# Patient Record
Sex: FEMALE | Race: OTHER | NOT HISPANIC OR LATINO | URBAN - METROPOLITAN AREA
[De-identification: names, ages, dates, MRNs, and addresses within clinical notes are randomized per-mention and may not be internally consistent; named-entity substitution may affect disease eponyms.]

---

## 2014-12-22 RX ORDER — IBUPROFEN 200 MG
1 TABLET ORAL
Qty: 0 | Refills: 0 | DISCHARGE
Start: 2014-12-22

## 2017-03-24 ENCOUNTER — OUTPATIENT (OUTPATIENT)
Dept: OUTPATIENT SERVICES | Facility: HOSPITAL | Age: 27
LOS: 1 days | End: 2017-03-24

## 2017-03-24 DIAGNOSIS — Z3A.00 WEEKS OF GESTATION OF PREGNANCY NOT SPECIFIED: ICD-10-CM

## 2017-03-24 DIAGNOSIS — O26.899 OTHER SPECIFIED PREGNANCY RELATED CONDITIONS, UNSPECIFIED TRIMESTER: ICD-10-CM

## 2017-03-27 ENCOUNTER — INPATIENT (INPATIENT)
Facility: HOSPITAL | Age: 27
LOS: 1 days | Discharge: HOME | End: 2017-03-29
Attending: SPECIALIST | Admitting: SPECIALIST

## 2017-03-27 VITALS — WEIGHT: 273.37 LBS | HEIGHT: 67 IN

## 2017-03-27 DIAGNOSIS — O26.899 OTHER SPECIFIED PREGNANCY RELATED CONDITIONS, UNSPECIFIED TRIMESTER: ICD-10-CM

## 2017-03-27 DIAGNOSIS — Z3A.00 WEEKS OF GESTATION OF PREGNANCY NOT SPECIFIED: ICD-10-CM

## 2017-03-27 LAB
BASOPHILS # BLD AUTO: 0.01 K/UL — SIGNIFICANT CHANGE UP (ref 0–0.2)
BASOPHILS NFR BLD AUTO: 0.1 % — SIGNIFICANT CHANGE UP (ref 0–2)
BLD GP AB SCN SERPL QL: NEGATIVE — SIGNIFICANT CHANGE UP
EOSINOPHIL # BLD AUTO: 0.05 K/UL — SIGNIFICANT CHANGE UP (ref 0–0.5)
EOSINOPHIL NFR BLD AUTO: 0.6 % — SIGNIFICANT CHANGE UP (ref 0–6)
HCT VFR BLD CALC: 35.4 % — SIGNIFICANT CHANGE UP (ref 34.5–45)
HGB BLD-MCNC: 11.6 G/DL — SIGNIFICANT CHANGE UP (ref 11.5–15.5)
IMM GRANULOCYTES NFR BLD AUTO: 0.4 % — SIGNIFICANT CHANGE UP (ref 0–1.5)
LYMPHOCYTES # BLD AUTO: 1.8 K/UL — SIGNIFICANT CHANGE UP (ref 1–3.3)
LYMPHOCYTES # BLD AUTO: 21 % — SIGNIFICANT CHANGE UP (ref 13–44)
MCHC RBC-ENTMCNC: 26.5 PG — LOW (ref 27–34)
MCHC RBC-ENTMCNC: 32.8 % — SIGNIFICANT CHANGE UP (ref 32–36)
MCV RBC AUTO: 80.8 FL — SIGNIFICANT CHANGE UP (ref 80–100)
MONOCYTES # BLD AUTO: 0.26 K/UL — SIGNIFICANT CHANGE UP (ref 0–0.9)
MONOCYTES NFR BLD AUTO: 3 % — SIGNIFICANT CHANGE UP (ref 2–14)
NEUTROPHILS # BLD AUTO: 6.41 K/UL — SIGNIFICANT CHANGE UP (ref 1.8–7.4)
NEUTROPHILS NFR BLD AUTO: 74.9 % — SIGNIFICANT CHANGE UP (ref 43–77)
PLATELET # BLD AUTO: 189 K/UL — SIGNIFICANT CHANGE UP (ref 150–400)
PMV BLD: 10.6 FL — SIGNIFICANT CHANGE UP (ref 7–13)
RBC # BLD: 4.38 M/UL — SIGNIFICANT CHANGE UP (ref 3.8–5.2)
RBC # FLD: 15.7 % — HIGH (ref 10.3–14.5)
RH IG SCN BLD-IMP: POSITIVE — SIGNIFICANT CHANGE UP
T PALLIDUM AB TITR SER: NEGATIVE — SIGNIFICANT CHANGE UP
WBC # BLD: 8.56 K/UL — SIGNIFICANT CHANGE UP (ref 3.8–10.5)
WBC # FLD AUTO: 8.56 K/UL — SIGNIFICANT CHANGE UP (ref 3.8–10.5)

## 2017-03-27 RX ORDER — OXYTOCIN 10 UNIT/ML
333.33 VIAL (ML) INJECTION
Qty: 20 | Refills: 0 | Status: DISCONTINUED | OUTPATIENT
Start: 2017-03-27 | End: 2017-03-27

## 2017-03-27 RX ORDER — PRAMOXINE HYDROCHLORIDE 150 MG/15G
1 AEROSOL, FOAM RECTAL EVERY 4 HOURS
Qty: 0 | Refills: 0 | Status: DISCONTINUED | OUTPATIENT
Start: 2017-03-27 | End: 2017-03-27

## 2017-03-27 RX ORDER — KETOROLAC TROMETHAMINE 30 MG/ML
30 SYRINGE (ML) INJECTION ONCE
Qty: 0 | Refills: 0 | Status: DISCONTINUED | OUTPATIENT
Start: 2017-03-27 | End: 2017-03-27

## 2017-03-27 RX ORDER — OXYCODONE HYDROCHLORIDE 5 MG/1
5 TABLET ORAL
Qty: 0 | Refills: 0 | Status: DISCONTINUED | OUTPATIENT
Start: 2017-03-27 | End: 2017-03-29

## 2017-03-27 RX ORDER — HYDROCORTISONE 1 %
1 OINTMENT (GRAM) TOPICAL EVERY 4 HOURS
Qty: 0 | Refills: 0 | Status: DISCONTINUED | OUTPATIENT
Start: 2017-03-27 | End: 2017-03-29

## 2017-03-27 RX ORDER — SODIUM CHLORIDE 9 MG/ML
1000 INJECTION, SOLUTION INTRAVENOUS ONCE
Qty: 0 | Refills: 0 | Status: COMPLETED | OUTPATIENT
Start: 2017-03-27 | End: 2017-03-27

## 2017-03-27 RX ORDER — SODIUM CHLORIDE 9 MG/ML
3 INJECTION INTRAMUSCULAR; INTRAVENOUS; SUBCUTANEOUS EVERY 8 HOURS
Qty: 0 | Refills: 0 | Status: DISCONTINUED | OUTPATIENT
Start: 2017-03-27 | End: 2017-03-28

## 2017-03-27 RX ORDER — HYDROCORTISONE 1 %
1 OINTMENT (GRAM) TOPICAL EVERY 4 HOURS
Qty: 0 | Refills: 0 | Status: DISCONTINUED | OUTPATIENT
Start: 2017-03-27 | End: 2017-03-27

## 2017-03-27 RX ORDER — ACETAMINOPHEN 500 MG
975 TABLET ORAL EVERY 6 HOURS
Qty: 0 | Refills: 0 | Status: DISCONTINUED | OUTPATIENT
Start: 2017-03-27 | End: 2017-03-29

## 2017-03-27 RX ORDER — DOCUSATE SODIUM 100 MG
100 CAPSULE ORAL
Qty: 0 | Refills: 0 | Status: DISCONTINUED | OUTPATIENT
Start: 2017-03-27 | End: 2017-03-29

## 2017-03-27 RX ORDER — PRAMOXINE HYDROCHLORIDE 150 MG/15G
1 AEROSOL, FOAM RECTAL EVERY 4 HOURS
Qty: 0 | Refills: 0 | Status: DISCONTINUED | OUTPATIENT
Start: 2017-03-27 | End: 2017-03-29

## 2017-03-27 RX ORDER — AER TRAVELER 0.5 G/1
1 SOLUTION RECTAL; TOPICAL EVERY 4 HOURS
Qty: 0 | Refills: 0 | Status: DISCONTINUED | OUTPATIENT
Start: 2017-03-27 | End: 2017-03-29

## 2017-03-27 RX ORDER — DIBUCAINE 1 %
1 OINTMENT (GRAM) RECTAL
Qty: 0 | Refills: 0 | DISCHARGE
Start: 2017-03-27

## 2017-03-27 RX ORDER — OXYTOCIN 10 UNIT/ML
41.67 VIAL (ML) INJECTION
Qty: 20 | Refills: 0 | Status: DISCONTINUED | OUTPATIENT
Start: 2017-03-27 | End: 2017-03-27

## 2017-03-27 RX ORDER — PENICILLIN G POTASSIUM 5000000 [IU]/1
POWDER, FOR SOLUTION INTRAMUSCULAR; INTRAPLEURAL; INTRATHECAL; INTRAVENOUS
Qty: 0 | Refills: 0 | Status: DISCONTINUED | OUTPATIENT
Start: 2017-03-27 | End: 2017-03-27

## 2017-03-27 RX ORDER — TETANUS TOXOID, REDUCED DIPHTHERIA TOXOID AND ACELLULAR PERTUSSIS VACCINE, ADSORBED 5; 2.5; 8; 8; 2.5 [IU]/.5ML; [IU]/.5ML; UG/.5ML; UG/.5ML; UG/.5ML
0.5 SUSPENSION INTRAMUSCULAR ONCE
Qty: 0 | Refills: 0 | Status: DISCONTINUED | OUTPATIENT
Start: 2017-03-27 | End: 2017-03-29

## 2017-03-27 RX ORDER — IBUPROFEN 200 MG
600 TABLET ORAL EVERY 6 HOURS
Qty: 0 | Refills: 0 | Status: DISCONTINUED | OUTPATIENT
Start: 2017-03-27 | End: 2017-03-29

## 2017-03-27 RX ORDER — GLYCERIN ADULT
1 SUPPOSITORY, RECTAL RECTAL AT BEDTIME
Qty: 0 | Refills: 0 | Status: DISCONTINUED | OUTPATIENT
Start: 2017-03-27 | End: 2017-03-29

## 2017-03-27 RX ORDER — CITRIC ACID/SODIUM CITRATE 300-500 MG
15 SOLUTION, ORAL ORAL EVERY 4 HOURS
Qty: 0 | Refills: 0 | Status: DISCONTINUED | OUTPATIENT
Start: 2017-03-27 | End: 2017-03-27

## 2017-03-27 RX ORDER — PENICILLIN G POTASSIUM 5000000 [IU]/1
5 POWDER, FOR SOLUTION INTRAMUSCULAR; INTRAPLEURAL; INTRATHECAL; INTRAVENOUS ONCE
Qty: 0 | Refills: 0 | Status: COMPLETED | OUTPATIENT
Start: 2017-03-27 | End: 2017-03-27

## 2017-03-27 RX ORDER — LANOLIN
1 OINTMENT (GRAM) TOPICAL EVERY 6 HOURS
Qty: 0 | Refills: 0 | Status: DISCONTINUED | OUTPATIENT
Start: 2017-03-27 | End: 2017-03-29

## 2017-03-27 RX ORDER — OXYCODONE HYDROCHLORIDE 5 MG/1
5 TABLET ORAL EVERY 4 HOURS
Qty: 0 | Refills: 0 | Status: DISCONTINUED | OUTPATIENT
Start: 2017-03-27 | End: 2017-03-29

## 2017-03-27 RX ORDER — OXYTOCIN 10 UNIT/ML
2 VIAL (ML) INJECTION
Qty: 30 | Refills: 0 | Status: DISCONTINUED | OUTPATIENT
Start: 2017-03-27 | End: 2017-03-27

## 2017-03-27 RX ORDER — DIBUCAINE 1 %
1 OINTMENT (GRAM) RECTAL EVERY 4 HOURS
Qty: 0 | Refills: 0 | Status: DISCONTINUED | OUTPATIENT
Start: 2017-03-27 | End: 2017-03-29

## 2017-03-27 RX ORDER — SODIUM CHLORIDE 9 MG/ML
3 INJECTION INTRAMUSCULAR; INTRAVENOUS; SUBCUTANEOUS EVERY 8 HOURS
Qty: 0 | Refills: 0 | Status: DISCONTINUED | OUTPATIENT
Start: 2017-03-27 | End: 2017-03-27

## 2017-03-27 RX ORDER — MAGNESIUM HYDROXIDE 400 MG/1
30 TABLET, CHEWABLE ORAL
Qty: 0 | Refills: 0 | Status: DISCONTINUED | OUTPATIENT
Start: 2017-03-27 | End: 2017-03-29

## 2017-03-27 RX ORDER — SIMETHICONE 80 MG/1
80 TABLET, CHEWABLE ORAL EVERY 6 HOURS
Qty: 0 | Refills: 0 | Status: DISCONTINUED | OUTPATIENT
Start: 2017-03-27 | End: 2017-03-29

## 2017-03-27 RX ORDER — DIPHENHYDRAMINE HCL 50 MG
25 CAPSULE ORAL EVERY 6 HOURS
Qty: 0 | Refills: 0 | Status: DISCONTINUED | OUTPATIENT
Start: 2017-03-27 | End: 2017-03-29

## 2017-03-27 RX ORDER — AER TRAVELER 0.5 G/1
1 SOLUTION RECTAL; TOPICAL EVERY 4 HOURS
Qty: 0 | Refills: 0 | Status: DISCONTINUED | OUTPATIENT
Start: 2017-03-27 | End: 2017-03-27

## 2017-03-27 RX ORDER — PENICILLIN G POTASSIUM 5000000 [IU]/1
2.5 POWDER, FOR SOLUTION INTRAMUSCULAR; INTRAPLEURAL; INTRATHECAL; INTRAVENOUS EVERY 4 HOURS
Qty: 0 | Refills: 0 | Status: DISCONTINUED | OUTPATIENT
Start: 2017-03-27 | End: 2017-03-27

## 2017-03-27 RX ORDER — SODIUM CHLORIDE 9 MG/ML
1000 INJECTION, SOLUTION INTRAVENOUS
Qty: 0 | Refills: 0 | Status: DISCONTINUED | OUTPATIENT
Start: 2017-03-27 | End: 2017-03-27

## 2017-03-27 RX ORDER — DIBUCAINE 1 %
1 OINTMENT (GRAM) RECTAL EVERY 4 HOURS
Qty: 0 | Refills: 0 | Status: DISCONTINUED | OUTPATIENT
Start: 2017-03-27 | End: 2017-03-27

## 2017-03-27 RX ADMIN — SODIUM CHLORIDE 2000 MILLILITER(S): 9 INJECTION, SOLUTION INTRAVENOUS at 15:35

## 2017-03-27 RX ADMIN — PENICILLIN G POTASSIUM 100 MILLION UNIT(S): 5000000 POWDER, FOR SOLUTION INTRAMUSCULAR; INTRAPLEURAL; INTRATHECAL; INTRAVENOUS at 16:10

## 2017-03-27 RX ADMIN — SODIUM CHLORIDE 125 MILLILITER(S): 9 INJECTION, SOLUTION INTRAVENOUS at 12:15

## 2017-03-27 RX ADMIN — Medication 125 MILLIUNIT(S)/MIN: at 20:13

## 2017-03-27 RX ADMIN — Medication 30 MILLIGRAM(S): at 20:28

## 2017-03-27 RX ADMIN — Medication 1000 MILLIUNIT(S)/MIN: at 20:11

## 2017-03-27 RX ADMIN — PENICILLIN G POTASSIUM 100 MILLION UNIT(S): 5000000 POWDER, FOR SOLUTION INTRAMUSCULAR; INTRAPLEURAL; INTRATHECAL; INTRAVENOUS at 12:01

## 2017-03-27 RX ADMIN — Medication 30 MILLIGRAM(S): at 21:00

## 2017-03-27 RX ADMIN — Medication 2 MILLIUNIT(S)/MIN: at 16:38

## 2017-03-27 NOTE — DISCHARGE NOTE OB - MEDICATION SUMMARY - MEDICATIONS TO TAKE
I will START or STAY ON the medications listed below when I get home from the hospital:    ibuprofen 600 mg oral tablet  -- 1 tab(s) by mouth every 6 hours, As needed,   -- Indication: For pain    dibucaine 1% topical ointment  -- 1 application on skin every 4 hours, As needed, Tenderness of the episiotomy site  -- Indication: For perineum discomfort    Prenatal Multivitamins with Folic Acid 1 mg oral tablet  -- 1 tab(s) by mouth once a day  -- Indication: For vitamins

## 2017-03-27 NOTE — DISCHARGE NOTE OB - CARE PROVIDER_API CALL
King Aiken (MD), Obstetrics and Gynecology  [address] [place] [address]  Phone: [phone]  Fax: [phone]

## 2017-03-27 NOTE — DISCHARGE NOTE OB - PATIENT PORTAL LINK FT
“You can access the FollowHealth Patient Portal, offered by [facility], by registering with the following website: [email or web address]”

## 2017-03-27 NOTE — DISCHARGE NOTE OB - MEDICATION SUMMARY - MEDICATIONS TO STOP TAKING
I will STOP taking the medications listed below when I get home from the hospital:    ibuprofen 600 mg oral tablet  -- 1 tab(s) by mouth every 6 hours, As needed, Moderate Pain    Prenatal Multivitamins with Folic Acid 1 mg oral tablet  -- 1 tab(s) by mouth once a day    dibucaine 1% topical ointment  -- 1 application on skin every 4 hours, As needed, Tenderness of the episiotomy site

## 2017-03-27 NOTE — DISCHARGE NOTE OB - CARE PLAN
Principal Discharge DX:	Normal vaginal delivery  Goal:	discharge 3/29/17  Instructions for follow-up, activity and diet:	follow up in 6 weeks

## 2017-03-28 RX ADMIN — Medication 600 MILLIGRAM(S): at 13:15

## 2017-03-28 RX ADMIN — Medication 975 MILLIGRAM(S): at 05:10

## 2017-03-28 RX ADMIN — Medication 1 SUPPOSITORY(S): at 19:45

## 2017-03-28 RX ADMIN — SODIUM CHLORIDE 3 MILLILITER(S): 9 INJECTION INTRAMUSCULAR; INTRAVENOUS; SUBCUTANEOUS at 15:14

## 2017-03-28 RX ADMIN — Medication 1 TABLET(S): at 12:43

## 2017-03-28 RX ADMIN — SODIUM CHLORIDE 3 MILLILITER(S): 9 INJECTION INTRAMUSCULAR; INTRAVENOUS; SUBCUTANEOUS at 05:10

## 2017-03-28 RX ADMIN — Medication 600 MILLIGRAM(S): at 12:40

## 2017-03-28 RX ADMIN — Medication 600 MILLIGRAM(S): at 05:10

## 2017-03-28 RX ADMIN — Medication 100 MILLIGRAM(S): at 16:38

## 2017-03-28 RX ADMIN — Medication 600 MILLIGRAM(S): at 04:36

## 2017-03-28 RX ADMIN — Medication 100 MILLIGRAM(S): at 12:43

## 2017-03-28 RX ADMIN — Medication 975 MILLIGRAM(S): at 04:36

## 2017-03-29 VITALS
TEMPERATURE: 98 F | DIASTOLIC BLOOD PRESSURE: 58 MMHG | HEART RATE: 64 BPM | OXYGEN SATURATION: 99 % | RESPIRATION RATE: 19 BRPM | SYSTOLIC BLOOD PRESSURE: 100 MMHG

## 2017-03-29 RX ADMIN — OXYCODONE HYDROCHLORIDE 5 MILLIGRAM(S): 5 TABLET ORAL at 00:35

## 2017-03-29 RX ADMIN — Medication 600 MILLIGRAM(S): at 06:07

## 2017-03-29 RX ADMIN — Medication 600 MILLIGRAM(S): at 01:10

## 2017-03-29 RX ADMIN — OXYCODONE HYDROCHLORIDE 5 MILLIGRAM(S): 5 TABLET ORAL at 01:10

## 2017-03-29 RX ADMIN — Medication 600 MILLIGRAM(S): at 07:00

## 2017-03-29 RX ADMIN — OXYCODONE HYDROCHLORIDE 5 MILLIGRAM(S): 5 TABLET ORAL at 07:00

## 2017-03-29 RX ADMIN — OXYCODONE HYDROCHLORIDE 5 MILLIGRAM(S): 5 TABLET ORAL at 06:07

## 2017-03-29 RX ADMIN — Medication 975 MILLIGRAM(S): at 00:35

## 2017-03-29 RX ADMIN — Medication 975 MILLIGRAM(S): at 06:07

## 2017-03-29 RX ADMIN — Medication 975 MILLIGRAM(S): at 01:10

## 2017-03-29 RX ADMIN — Medication 975 MILLIGRAM(S): at 07:00

## 2017-03-29 RX ADMIN — Medication 600 MILLIGRAM(S): at 00:35

## 2017-05-31 PROBLEM — Z00.00 ENCOUNTER FOR PREVENTIVE HEALTH EXAMINATION: Status: ACTIVE | Noted: 2017-05-31

## 2017-06-27 ENCOUNTER — APPOINTMENT (OUTPATIENT)
Dept: OBGYN | Facility: CLINIC | Age: 27
End: 2017-06-27

## 2017-06-27 VITALS
SYSTOLIC BLOOD PRESSURE: 118 MMHG | DIASTOLIC BLOOD PRESSURE: 76 MMHG | BODY MASS INDEX: 43.07 KG/M2 | WEIGHT: 274.4 LBS | HEART RATE: 82 BPM | HEIGHT: 67 IN

## 2017-06-27 DIAGNOSIS — Z01.818 ENCOUNTER FOR OTHER PREPROCEDURAL EXAMINATION: ICD-10-CM

## 2017-06-27 DIAGNOSIS — E66.01 MORBID (SEVERE) OBESITY DUE TO EXCESS CALORIES: ICD-10-CM

## 2017-06-27 DIAGNOSIS — Z83.3 FAMILY HISTORY OF DIABETES MELLITUS: ICD-10-CM

## 2017-06-29 PROBLEM — E66.01 MORBID OBESITY WITH BMI OF 40.0-44.9, ADULT: Status: RESOLVED | Noted: 2017-06-29 | Resolved: 2017-06-29

## 2017-06-29 PROBLEM — Z83.3 FAMILY HISTORY OF TYPE 2 DIABETES MELLITUS: Status: ACTIVE | Noted: 2017-06-29

## 2017-06-30 ENCOUNTER — TRANSCRIPTION ENCOUNTER (OUTPATIENT)
Age: 27
End: 2017-06-30

## 2018-03-03 DIAGNOSIS — Z78.9 OTHER SPECIFIED HEALTH STATUS: ICD-10-CM

## 2018-07-26 PROBLEM — Z78.9 ALCOHOL USE: Status: INACTIVE | Noted: 2017-06-27

## 2019-12-02 ENCOUNTER — RESULT REVIEW (OUTPATIENT)
Age: 29
End: 2019-12-02

## 2021-02-24 ENCOUNTER — RESULT REVIEW (OUTPATIENT)
Age: 31
End: 2021-02-24

## 2022-06-27 ENCOUNTER — RESULT REVIEW (OUTPATIENT)
Age: 32
End: 2022-06-27

## 2023-07-26 ENCOUNTER — INPATIENT (INPATIENT)
Facility: HOSPITAL | Age: 33
LOS: 1 days | Discharge: HOME | End: 2023-07-28
Attending: SURGERY | Admitting: SURGERY
Payer: COMMERCIAL

## 2023-07-26 ENCOUNTER — TRANSCRIPTION ENCOUNTER (OUTPATIENT)
Age: 33
End: 2023-07-26

## 2023-07-26 VITALS
DIASTOLIC BLOOD PRESSURE: 66 MMHG | OXYGEN SATURATION: 100 % | HEART RATE: 78 BPM | TEMPERATURE: 98 F | RESPIRATION RATE: 18 BRPM | SYSTOLIC BLOOD PRESSURE: 125 MMHG

## 2023-07-26 DIAGNOSIS — R10.13 EPIGASTRIC PAIN: ICD-10-CM

## 2023-07-26 LAB
ALBUMIN SERPL ELPH-MCNC: 4.2 G/DL — SIGNIFICANT CHANGE UP (ref 3.3–5)
ALP SERPL-CCNC: 85 U/L — SIGNIFICANT CHANGE UP (ref 40–120)
ALT FLD-CCNC: 22 U/L — SIGNIFICANT CHANGE UP (ref 4–33)
ANION GAP SERPL CALC-SCNC: 13 MMOL/L — SIGNIFICANT CHANGE UP (ref 7–14)
AST SERPL-CCNC: 20 U/L — SIGNIFICANT CHANGE UP (ref 4–32)
BASOPHILS # BLD AUTO: 0.03 K/UL — SIGNIFICANT CHANGE UP (ref 0–0.2)
BASOPHILS NFR BLD AUTO: 0.2 % — SIGNIFICANT CHANGE UP (ref 0–2)
BILIRUB SERPL-MCNC: 1 MG/DL — SIGNIFICANT CHANGE UP (ref 0.2–1.2)
BUN SERPL-MCNC: 7 MG/DL — SIGNIFICANT CHANGE UP (ref 7–23)
CALCIUM SERPL-MCNC: 9.3 MG/DL — SIGNIFICANT CHANGE UP (ref 8.4–10.5)
CHLORIDE SERPL-SCNC: 103 MMOL/L — SIGNIFICANT CHANGE UP (ref 98–107)
CO2 SERPL-SCNC: 22 MMOL/L — SIGNIFICANT CHANGE UP (ref 22–31)
CREAT SERPL-MCNC: 0.75 MG/DL — SIGNIFICANT CHANGE UP (ref 0.5–1.3)
EGFR: 107 ML/MIN/1.73M2 — SIGNIFICANT CHANGE UP
EOSINOPHIL # BLD AUTO: 0.07 K/UL — SIGNIFICANT CHANGE UP (ref 0–0.5)
EOSINOPHIL NFR BLD AUTO: 0.6 % — SIGNIFICANT CHANGE UP (ref 0–6)
GLUCOSE SERPL-MCNC: 95 MG/DL — SIGNIFICANT CHANGE UP (ref 70–99)
HCT VFR BLD CALC: 38.2 % — SIGNIFICANT CHANGE UP (ref 34.5–45)
HGB BLD-MCNC: 12.2 G/DL — SIGNIFICANT CHANGE UP (ref 11.5–15.5)
IANC: 9.42 K/UL — HIGH (ref 1.8–7.4)
IMM GRANULOCYTES NFR BLD AUTO: 0.2 % — SIGNIFICANT CHANGE UP (ref 0–0.9)
LIDOCAIN IGE QN: 24 U/L — SIGNIFICANT CHANGE UP (ref 7–60)
LYMPHOCYTES # BLD AUTO: 19.6 % — SIGNIFICANT CHANGE UP (ref 13–44)
LYMPHOCYTES # BLD AUTO: 2.49 K/UL — SIGNIFICANT CHANGE UP (ref 1–3.3)
MCHC RBC-ENTMCNC: 26 PG — LOW (ref 27–34)
MCHC RBC-ENTMCNC: 31.9 GM/DL — LOW (ref 32–36)
MCV RBC AUTO: 81.3 FL — SIGNIFICANT CHANGE UP (ref 80–100)
MONOCYTES # BLD AUTO: 0.68 K/UL — SIGNIFICANT CHANGE UP (ref 0–0.9)
MONOCYTES NFR BLD AUTO: 5.3 % — SIGNIFICANT CHANGE UP (ref 2–14)
NEUTROPHILS # BLD AUTO: 9.42 K/UL — HIGH (ref 1.8–7.4)
NEUTROPHILS NFR BLD AUTO: 74.1 % — SIGNIFICANT CHANGE UP (ref 43–77)
NRBC # BLD: 0 /100 WBCS — SIGNIFICANT CHANGE UP (ref 0–0)
NRBC # FLD: 0 K/UL — SIGNIFICANT CHANGE UP (ref 0–0)
PLATELET # BLD AUTO: 269 K/UL — SIGNIFICANT CHANGE UP (ref 150–400)
POTASSIUM SERPL-MCNC: 4 MMOL/L — SIGNIFICANT CHANGE UP (ref 3.5–5.3)
POTASSIUM SERPL-SCNC: 4 MMOL/L — SIGNIFICANT CHANGE UP (ref 3.5–5.3)
PROT SERPL-MCNC: 7.7 G/DL — SIGNIFICANT CHANGE UP (ref 6–8.3)
RBC # BLD: 4.7 M/UL — SIGNIFICANT CHANGE UP (ref 3.8–5.2)
RBC # FLD: 14.3 % — SIGNIFICANT CHANGE UP (ref 10.3–14.5)
SODIUM SERPL-SCNC: 138 MMOL/L — SIGNIFICANT CHANGE UP (ref 135–145)
WBC # BLD: 12.72 K/UL — HIGH (ref 3.8–10.5)
WBC # FLD AUTO: 12.72 K/UL — HIGH (ref 3.8–10.5)

## 2023-07-26 PROCEDURE — 99285 EMERGENCY DEPT VISIT HI MDM: CPT

## 2023-07-26 PROCEDURE — 76705 ECHO EXAM OF ABDOMEN: CPT | Mod: 26

## 2023-07-26 PROCEDURE — 99221 1ST HOSP IP/OBS SF/LOW 40: CPT | Mod: 57,GC

## 2023-07-26 RX ORDER — HYDROMORPHONE HYDROCHLORIDE 2 MG/ML
1 INJECTION INTRAMUSCULAR; INTRAVENOUS; SUBCUTANEOUS EVERY 4 HOURS
Refills: 0 | Status: DISCONTINUED | OUTPATIENT
Start: 2023-07-26 | End: 2023-07-28

## 2023-07-26 RX ORDER — ACETAMINOPHEN 500 MG
1000 TABLET ORAL ONCE
Refills: 0 | Status: COMPLETED | OUTPATIENT
Start: 2023-07-26 | End: 2023-07-26

## 2023-07-26 RX ORDER — MORPHINE SULFATE 50 MG/1
4 CAPSULE, EXTENDED RELEASE ORAL ONCE
Refills: 0 | Status: DISCONTINUED | OUTPATIENT
Start: 2023-07-26 | End: 2023-07-26

## 2023-07-26 RX ORDER — HYDROMORPHONE HYDROCHLORIDE 2 MG/ML
0.5 INJECTION INTRAMUSCULAR; INTRAVENOUS; SUBCUTANEOUS EVERY 4 HOURS
Refills: 0 | Status: DISCONTINUED | OUTPATIENT
Start: 2023-07-26 | End: 2023-07-28

## 2023-07-26 RX ORDER — ENOXAPARIN SODIUM 100 MG/ML
40 INJECTION SUBCUTANEOUS EVERY 24 HOURS
Refills: 0 | Status: DISCONTINUED | OUTPATIENT
Start: 2023-07-26 | End: 2023-07-28

## 2023-07-26 RX ORDER — ACETAMINOPHEN 500 MG
1000 TABLET ORAL EVERY 6 HOURS
Refills: 0 | Status: COMPLETED | OUTPATIENT
Start: 2023-07-27 | End: 2023-07-28

## 2023-07-26 RX ORDER — SODIUM CHLORIDE 9 MG/ML
1000 INJECTION INTRAMUSCULAR; INTRAVENOUS; SUBCUTANEOUS ONCE
Refills: 0 | Status: COMPLETED | OUTPATIENT
Start: 2023-07-26 | End: 2023-07-26

## 2023-07-26 RX ORDER — SODIUM CHLORIDE 9 MG/ML
1000 INJECTION, SOLUTION INTRAVENOUS
Refills: 0 | Status: DISCONTINUED | OUTPATIENT
Start: 2023-07-26 | End: 2023-07-27

## 2023-07-26 RX ADMIN — MORPHINE SULFATE 4 MILLIGRAM(S): 50 CAPSULE, EXTENDED RELEASE ORAL at 23:09

## 2023-07-26 RX ADMIN — MORPHINE SULFATE 4 MILLIGRAM(S): 50 CAPSULE, EXTENDED RELEASE ORAL at 23:51

## 2023-07-26 RX ADMIN — MORPHINE SULFATE 4 MILLIGRAM(S): 50 CAPSULE, EXTENDED RELEASE ORAL at 19:13

## 2023-07-26 RX ADMIN — SODIUM CHLORIDE 1000 MILLILITER(S): 9 INJECTION INTRAMUSCULAR; INTRAVENOUS; SUBCUTANEOUS at 19:14

## 2023-07-26 RX ADMIN — Medication 400 MILLIGRAM(S): at 23:37

## 2023-07-26 NOTE — ED ADULT NURSE NOTE - NSFALLUNIVINTERV_ED_ALL_ED
Bed/Stretcher in lowest position, wheels locked, appropriate side rails in place/Call bell, personal items and telephone in reach/Instruct patient to call for assistance before getting out of bed/chair/stretcher/Non-slip footwear applied when patient is off stretcher/[place] to call system/Physically safe environment - no spills, clutter or unnecessary equipment/Purposeful proactive rounding/Room/bathroom lighting operational, light cord in reach

## 2023-07-26 NOTE — ED PROVIDER NOTE - CLINICAL SUMMARY MEDICAL DECISION MAKING FREE TEXT BOX
Impression  Given testing done as an outpatient shows cholelithiasis likely cholelithiasis will get labs including LFTs, repeat ultrasound  Given intermittent nature and present for over a month we will consult surgery  We will treat pain with IV fluid and morphine

## 2023-07-26 NOTE — ED ADULT NURSE NOTE - OBJECTIVE STATEMENT
A&Ox4. ambulatory. c/o diffuse ABD pain since July. NAD. pt denies SOB, chest pain, dizziness, weakness, urinary symptoms, HA, n/v/d, fevers, chills. respirations are even and un labored. ABD is soft. skin intact. 20g placed to LAC. labs drawn and sent. safety precautions maintained.

## 2023-07-26 NOTE — ED PROVIDER NOTE - PHYSICAL EXAMINATION
Physical exam  Patient mildly uncomfortable, nontoxic-appearing  Vital signs stable  Lungs clear to auscultation bilaterally  S1-S2 no murmurs rubs or gallops  Abdomen severe pain to the epigastrium and right upper quadrant no rebound no guarding  Extremities no edema n/a

## 2023-07-26 NOTE — ED ADULT NURSE REASSESSMENT NOTE - SYMPTOMS
states abd pain has improved alittle but states back pain is the same. IV tylenol given as ordered/abdominal pain

## 2023-07-26 NOTE — ED ADULT TRIAGE NOTE - CHIEF COMPLAINT QUOTE
Pt st" My MD sent me told I have gallstones and my White count is very elevated. I been having pain since July."

## 2023-07-26 NOTE — ED ADULT NURSE REASSESSMENT NOTE - REASSESS COMMUNICATION
8S called report to ILYA Gallagher/inpatient nurse report called
medicated as ordered/ED physician notified

## 2023-07-26 NOTE — H&P ADULT - NSHPLABSRESULTS_GEN_ALL_CORE
12.2   12.72 )-----------( 269      ( 26 Jul 2023 19:15 )             38.2       07-26    138  |  103  |  7   ----------------------------<  95  4.0   |  22  |  0.75    Ca    9.3      26 Jul 2023 19:15    TPro  7.7  /  Alb  4.2  /  TBili  1.0  /  DBili  x   /  AST  20  /  ALT  22  /  AlkPhos  85  07-26              Urinalysis Basic - ( 26 Jul 2023 19:15 )    Color: x / Appearance: x / SG: x / pH: x  Gluc: 95 mg/dL / Ketone: x  / Bili: x / Urobili: x   Blood: x / Protein: x / Nitrite: x   Leuk Esterase: x / RBC: x / WBC x   Sq Epi: x / Non Sq Epi: x / Bacteria: x      < from: US Abdomen Upper Quadrant Right (07.26.23 @ 21:00) >    FINDINGS:  Liver: Within normal limits.  Bile ducts: Normal caliber. Common bile duct measures 6 mm.  Gallbladder: There is a small amount of gallbladder sludge. The   gallbladder is distended. There is mild gallbladder wall thickening.   There is positive sonographic Anne's sign.  Pancreas: Visualized portions are within normal limits.  Right kidney: 12.3 cm. No hydronephrosis.  Ascites: None.  IVC: Visualized portions are within normal limits.    IMPRESSION:  Distended gallbladder with mild gallbladder wall thickening, gallbladder   sludge, and positive Anne's sign. This is suggestive of possible   acalculous cholecystitis. If clinically warranted, a HIDA scan may be   performed for confirmation.    < end of copied text >

## 2023-07-26 NOTE — H&P ADULT - ATTENDING COMMENTS
acute calculous cholecystitis  admit, bowel rest, iv hydration, iv antibiotics  to OR pending availability  hand-off given to B team surgeon-of-week

## 2023-07-26 NOTE — H&P ADULT - HISTORY OF PRESENT ILLNESS
32 obese F no PSH presenting with intermittent RUQ pain radiating to her back for several months, however the pain became persistent and more sever over the last day, associated with nausea and two episodes of NBNB emesis. She denies fevers, chills, diarrhea, constipation.

## 2023-07-26 NOTE — H&P ADULT - ASSESSMENT
32F with acute cholecystitis.    Admit to Dr. Nomi Arndt  NPO, IVF  IV Abx: cefotetan  Pain control PRN  VTE ppx: SCDs, lovenox  OR for laparoscopic cholecystectomy      B Team Surgery [id number]

## 2023-07-26 NOTE — ED PROVIDER NOTE - OBJECTIVE STATEMENT
32-year-old female with no significant past medical history, no surgical history presents after outpatient labs and ultrasound show cholelithiasis with leukocytosis.  Patient went to her doctor complaint of epigastric pain and vomiting since yesterday.   has been having this pain intermittently for the past 3 to 4 weeks.  Patient  over the last year has lost approximately 20 to 30 pounds.   has modified her diet.  Denies fevers.  Denies blood in emesis.  Sent to emergency room for evaluation.

## 2023-07-27 ENCOUNTER — TRANSCRIPTION ENCOUNTER (OUTPATIENT)
Age: 33
End: 2023-07-27

## 2023-07-27 ENCOUNTER — RESULT REVIEW (OUTPATIENT)
Age: 33
End: 2023-07-27

## 2023-07-27 LAB
ALBUMIN SERPL ELPH-MCNC: 3.4 G/DL — SIGNIFICANT CHANGE UP (ref 3.3–5)
ALP SERPL-CCNC: 72 U/L — SIGNIFICANT CHANGE UP (ref 40–120)
ALT FLD-CCNC: 20 U/L — SIGNIFICANT CHANGE UP (ref 4–33)
ANION GAP SERPL CALC-SCNC: 16 MMOL/L — HIGH (ref 7–14)
APPEARANCE UR: CLEAR — SIGNIFICANT CHANGE UP
APTT BLD: 25.7 SEC — SIGNIFICANT CHANGE UP (ref 24.5–35.6)
AST SERPL-CCNC: 15 U/L — SIGNIFICANT CHANGE UP (ref 4–32)
BACTERIA # UR AUTO: NEGATIVE /HPF — SIGNIFICANT CHANGE UP
BILIRUB SERPL-MCNC: 1.2 MG/DL — SIGNIFICANT CHANGE UP (ref 0.2–1.2)
BILIRUB UR-MCNC: NEGATIVE — SIGNIFICANT CHANGE UP
BUN SERPL-MCNC: 6 MG/DL — LOW (ref 7–23)
CALCIUM SERPL-MCNC: 8.5 MG/DL — SIGNIFICANT CHANGE UP (ref 8.4–10.5)
CAST: 0 /LPF — SIGNIFICANT CHANGE UP (ref 0–4)
CHLORIDE SERPL-SCNC: 105 MMOL/L — SIGNIFICANT CHANGE UP (ref 98–107)
CO2 SERPL-SCNC: 19 MMOL/L — LOW (ref 22–31)
COLOR SPEC: YELLOW — SIGNIFICANT CHANGE UP
CREAT SERPL-MCNC: 0.77 MG/DL — SIGNIFICANT CHANGE UP (ref 0.5–1.3)
DIFF PNL FLD: NEGATIVE — SIGNIFICANT CHANGE UP
EGFR: 104 ML/MIN/1.73M2 — SIGNIFICANT CHANGE UP
GLUCOSE SERPL-MCNC: 84 MG/DL — SIGNIFICANT CHANGE UP (ref 70–99)
GLUCOSE UR QL: NEGATIVE MG/DL — SIGNIFICANT CHANGE UP
HCG SERPL-ACNC: <1 MIU/ML — SIGNIFICANT CHANGE UP
HCT VFR BLD CALC: 32.1 % — LOW (ref 34.5–45)
HGB BLD-MCNC: 10.3 G/DL — LOW (ref 11.5–15.5)
INR BLD: 1.03 RATIO — SIGNIFICANT CHANGE UP (ref 0.85–1.18)
KETONES UR-MCNC: NEGATIVE MG/DL — SIGNIFICANT CHANGE UP
LEUKOCYTE ESTERASE UR-ACNC: ABNORMAL
MCHC RBC-ENTMCNC: 25.3 PG — LOW (ref 27–34)
MCHC RBC-ENTMCNC: 32.1 GM/DL — SIGNIFICANT CHANGE UP (ref 32–36)
MCV RBC AUTO: 78.9 FL — LOW (ref 80–100)
NITRITE UR-MCNC: NEGATIVE — SIGNIFICANT CHANGE UP
NRBC # BLD: 0 /100 WBCS — SIGNIFICANT CHANGE UP (ref 0–0)
NRBC # FLD: 0 K/UL — SIGNIFICANT CHANGE UP (ref 0–0)
PH UR: 6 — SIGNIFICANT CHANGE UP (ref 5–8)
PHOSPHATE SERPL-MCNC: 3.2 MG/DL — SIGNIFICANT CHANGE UP (ref 2.5–4.5)
PLATELET # BLD AUTO: 206 K/UL — SIGNIFICANT CHANGE UP (ref 150–400)
POTASSIUM SERPL-MCNC: 3.4 MMOL/L — LOW (ref 3.5–5.3)
POTASSIUM SERPL-SCNC: 3.4 MMOL/L — LOW (ref 3.5–5.3)
PROT SERPL-MCNC: 6.4 G/DL — SIGNIFICANT CHANGE UP (ref 6–8.3)
PROT UR-MCNC: NEGATIVE MG/DL — SIGNIFICANT CHANGE UP
PROTHROM AB SERPL-ACNC: 11.5 SEC — SIGNIFICANT CHANGE UP (ref 9.5–13)
RBC # BLD: 4.07 M/UL — SIGNIFICANT CHANGE UP (ref 3.8–5.2)
RBC # FLD: 14.6 % — HIGH (ref 10.3–14.5)
RBC CASTS # UR COMP ASSIST: 1 /HPF — SIGNIFICANT CHANGE UP (ref 0–4)
SODIUM SERPL-SCNC: 140 MMOL/L — SIGNIFICANT CHANGE UP (ref 135–145)
SP GR SPEC: 1.02 — SIGNIFICANT CHANGE UP (ref 1–1.03)
SQUAMOUS # UR AUTO: 2 /HPF — SIGNIFICANT CHANGE UP (ref 0–5)
UROBILINOGEN FLD QL: 0.2 MG/DL — SIGNIFICANT CHANGE UP (ref 0.2–1)
WBC # BLD: 6.98 K/UL — SIGNIFICANT CHANGE UP (ref 3.8–10.5)
WBC # FLD AUTO: 6.98 K/UL — SIGNIFICANT CHANGE UP (ref 3.8–10.5)
WBC UR QL: 3 /HPF — SIGNIFICANT CHANGE UP (ref 0–5)

## 2023-07-27 PROCEDURE — 88304 TISSUE EXAM BY PATHOLOGIST: CPT | Mod: 26

## 2023-07-27 PROCEDURE — 47562 LAPAROSCOPIC CHOLECYSTECTOMY: CPT | Mod: GC

## 2023-07-27 DEVICE — ARISTA 3GR: Type: IMPLANTABLE DEVICE | Status: FUNCTIONAL

## 2023-07-27 DEVICE — CLIP APPLIER COVIDIEN ENDOCLIP III 5MM: Type: IMPLANTABLE DEVICE | Status: FUNCTIONAL

## 2023-07-27 RX ORDER — PIPERACILLIN AND TAZOBACTAM 4; .5 G/20ML; G/20ML
3.38 INJECTION, POWDER, LYOPHILIZED, FOR SOLUTION INTRAVENOUS EVERY 8 HOURS
Refills: 0 | Status: DISCONTINUED | OUTPATIENT
Start: 2023-07-27 | End: 2023-07-27

## 2023-07-27 RX ORDER — OXYCODONE HYDROCHLORIDE 5 MG/1
1 TABLET ORAL
Qty: 8 | Refills: 0
Start: 2023-07-27

## 2023-07-27 RX ORDER — PIPERACILLIN AND TAZOBACTAM 4; .5 G/20ML; G/20ML
3.38 INJECTION, POWDER, LYOPHILIZED, FOR SOLUTION INTRAVENOUS ONCE
Refills: 0 | Status: COMPLETED | OUTPATIENT
Start: 2023-07-27 | End: 2023-07-27

## 2023-07-27 RX ORDER — PIPERACILLIN AND TAZOBACTAM 4; .5 G/20ML; G/20ML
3.38 INJECTION, POWDER, LYOPHILIZED, FOR SOLUTION INTRAVENOUS ONCE
Refills: 0 | Status: DISCONTINUED | OUTPATIENT
Start: 2023-07-27 | End: 2023-07-27

## 2023-07-27 RX ADMIN — HYDROMORPHONE HYDROCHLORIDE 0.5 MILLIGRAM(S): 2 INJECTION INTRAMUSCULAR; INTRAVENOUS; SUBCUTANEOUS at 23:53

## 2023-07-27 RX ADMIN — PIPERACILLIN AND TAZOBACTAM 200 GRAM(S): 4; .5 INJECTION, POWDER, LYOPHILIZED, FOR SOLUTION INTRAVENOUS at 06:32

## 2023-07-27 RX ADMIN — PIPERACILLIN AND TAZOBACTAM 25 GRAM(S): 4; .5 INJECTION, POWDER, LYOPHILIZED, FOR SOLUTION INTRAVENOUS at 10:28

## 2023-07-27 RX ADMIN — Medication 400 MILLIGRAM(S): at 05:41

## 2023-07-27 RX ADMIN — SODIUM CHLORIDE 100 MILLILITER(S): 9 INJECTION, SOLUTION INTRAVENOUS at 10:28

## 2023-07-27 RX ADMIN — ENOXAPARIN SODIUM 40 MILLIGRAM(S): 100 INJECTION SUBCUTANEOUS at 23:52

## 2023-07-27 RX ADMIN — Medication 1000 MILLIGRAM(S): at 22:00

## 2023-07-27 RX ADMIN — Medication 400 MILLIGRAM(S): at 20:33

## 2023-07-27 RX ADMIN — Medication 1000 MILLIGRAM(S): at 06:11

## 2023-07-27 NOTE — PATIENT PROFILE ADULT - NSPROMEDSADMININFO_GEN_A_NUR
----- Message from Ericka Saunders sent at 2/25/2019  8:56 AM CST -----  Contact: Patient  The pt is calling to do her transmission. Please call her back @ [phone]. Thanks, Ericka    no concerns

## 2023-07-27 NOTE — BRIEF OPERATIVE NOTE - OPERATION/FINDINGS
Acute inflamed gallbladder, aspirated with needle, critical view achieved. Cystic duct and artery clipped with green hemolocks. Gallbladder taken in toto without spillage.

## 2023-07-27 NOTE — ASU PREOP CHECKLIST - PATIENT'S PERSONAL PROPERTY REMOVED
sent back to room/[facility] sent back to room- Kayla zuniga/trina sent back to room- Kayla CARABALLO aware/glasses/jewelry

## 2023-07-27 NOTE — DISCHARGE NOTE PROVIDER - HOSPITAL COURSE
32 year old obese female with no PMHx/PSHx presented to [facility] 7/26 with intermittent RUQ pain radiating to her back for several months, worsening in the last day and associated with nausea and two episode of emesis. Imaging showing the following:  "IMPRESSION: Distended gallbladder with mild gallbladder wall thickening, gallbladder sludge, and positive Anne's sign. This is suggestive of possible acalculous cholecystitis. If clinically warranted, a HIDA scan may be performed for confirmation."  Patient diagnosed with acute cholecystitis and added on to the OR schedule for a laparoscopic cholecystectomy.  On 7/27, patient underwent  ***********       32 year old obese female with no PMHx/PSHx presented to [facility] 7/26 with intermittent RUQ pain radiating to her back for several months, worsening in the last day and associated with nausea and two episode of emesis. Imaging showing the following: "IMPRESSION: Distended gallbladder with mild gallbladder wall thickening, gallbladder sludge, and positive Anne's sign. This is suggestive of possible acalculous cholecystitis. If clinically warranted, a HIDA scan may be performed for confirmation." Patient diagnosed with acute cholecystitis and added on to the OR schedule for a laparoscopic cholecystectomy.  Patient underwent laparoscopic cholecystectomy on 07/27/23 and tolerated procedure well. At the time of discharge, the patient was hemodynamically stable, was tolerating PO diet, was voiding urine, was ambulating, and was comfortable with adequate pain control. The patient was instructed to follow up with Dr. Feldman within 1-2 weeks after discharge from the hospital. The patient felt comfortable with discharge. The patient was discharged to home. The patient had no other issues.

## 2023-07-27 NOTE — DISCHARGE NOTE PROVIDER - NSDCMRMEDTOKEN_GEN_ALL_CORE_FT
oxyCODONE 5 mg oral tablet: 1 tab(s) orally every 4 to 6 hours as needed for  severe pain MDD: 6   acetaminophen 325 mg oral tablet: 3 tab(s) orally every 6 hours  ibuprofen 400 mg oral tablet: 1 tab(s) orally every 6 hours  oxyCODONE 5 mg oral tablet: 1 tab(s) orally every 4 to 6 hours as needed for  severe pain MDD: 6

## 2023-07-27 NOTE — DISCHARGE NOTE PROVIDER - NSDCFUADDINST_GEN_ALL_CORE_FT
Post-operative Instructions    	Wound dressing- You have a transparent/purple liquid dressing (called Dermabond) over your surgical incisions called. Do NOT remove the Dermabond. In a few days, the Dermabond will fall off (or peel off) on its own.    	Showering/Bathing- You may shower over the dressing the very next day after surgery.  Allow the water to run over the dressing, but do not scrub the area.  Do not sit in a bathtub or swimming pool for at least two weeks after surgery.    	Activity level- You may resume most normal daily activity as tolerated, but avoid strenuous activities such as aerobics, jogging, exercising or heavy lifting for at least 2 weeks after surgery.  You may return to work in 1-2 days after surgery.  You may drive as long as you are not taking any prescription pain medication.    	Pain Medication- Please take tylenol every 6 hours, and stagger with ibuprofen every 6 hours that you take in a pain medication every 3 hours. This will allow you to alternate medications for more consistent pain control. For example: take a dose of tylenol at 8 am, then take a dose of ibuprofen at 11 am, then take a dose of tylenol at 2pm, and continue as needed. You may take the prescribed pain medication for any break through pain as needed.    	Follow-up Appointment- Please call the office to schedule your post-operative appointment which should be approximately 10-14 days after your surgery.     	Bruising/Bleeding/Swelling- It is normal for there to be some bruising and swelling at the site, and there may be some staining of blood on to the dressing.  Some discomfort at the surgical site is expected.  If your symptoms seem excessive, or if you have any question or concerns, please call the office.      Anesthesia Precautions:  For the next 12 hours do not:   •	drive a car,  •	drink alcohol, beer, or wine  •	make important personal or business decisions    Diet:   •	Progress diet slowly unless otherwise indicated    Physician Notification  •	Any Prescription issues  •	Bleeding that does not stop  •	Persistent nausea and vomiting  •	Pain not relieved by medications  •	Fever greater than 101®F  •	Inability to tolerate liquids or foods  •	Unable to urinate after 8 hours    Follow Up Care:  •	In the event that you develop a complication and you are unable to reach your own physician, you may contact:  [phone] or go to the nearest     Emergency Room  •	Please call your surgeon to schedule your follow up appointment

## 2023-07-27 NOTE — PROGRESS NOTE ADULT - SUBJECTIVE AND OBJECTIVE BOX
Surgery Progress Note    S: Patient seen and examined. No acute events overnight.    O:  Physical Exam:  Gen: Laying in bed, NAD  HEENT: atrumatic, EMOI  Resp: Unlabored breathing  Abd: soft, nondistended, no rebound or guarding. Tender in RUQ.  Ext: Moves 4 extremities spontaneously    Vital Signs Last 24 Hrs  T(C): 37.1 (27 Jul 2023 10:00), Max: 37.1 (27 Jul 2023 10:00)  T(F): 98.8 (27 Jul 2023 10:00), Max: 98.8 (27 Jul 2023 10:00)  HR: 67 (27 Jul 2023 10:00) (65 - 82)  BP: 103/59 (27 Jul 2023 10:00) (103/59 - 125/66)  BP(mean): --  RR: 18 (27 Jul 2023 10:00) (18 - 18)  SpO2: 98% (27 Jul 2023 10:00) (97% - 100%)    Parameters below as of 27 Jul 2023 05:41  Patient On (Oxygen Delivery Method): room air        I&O's Detail    26 Jul 2023 07:01  -  27 Jul 2023 07:00  --------------------------------------------------------  IN:    Lactated Ringers: 400 mL  Total IN: 400 mL    OUT:    Oral Fluid: 0 mL    Voided (mL): 800 mL  Total OUT: 800 mL    Total NET: -400 mL                                10.3   6.98  )-----------( 206      ( 27 Jul 2023 07:28 )             32.1       07-27    140  |  105  |  6<L>  ----------------------------<  84  3.4<L>   |  19<L>  |  0.77    Ca    8.5      27 Jul 2023 07:28  Phos  3.2     07-27    TPro  6.4  /  Alb  3.4  /  TBili  1.2  /  DBili  x   /  AST  15  /  ALT  20  /  AlkPhos  72  07-27

## 2023-07-27 NOTE — ASU PREOP CHECKLIST - 3.
Jewelry (earrings) removed and placed in labeled bag returned to floor Jewelry (earrings) removed and placed in labeled bag  with eye glasses and returned to floor

## 2023-07-27 NOTE — PROGRESS NOTE ADULT - ASSESSMENT
32 obese F no PSH presenting with intermittent RUQ pain radiating to her back for several months, however the pain became persistent and more severe over the past day prior to presentation, associated with nausea and two episodes of NBNB emesis. She denies fevers, chills, diarrhea, constipation. RUQ US showed distended gallbladder with mild wall thickening, sludge, and positive Anne's sign, consistent with acute cholecystitis. CBD 6mm. WBC and Tbili wnl.     Plan:  - Zosyn  - NPO/IVF  -Add on today for lap erlinda possible IOC  - pain control prn  - DVT ppx      B Team Surgery [id number]

## 2023-07-27 NOTE — PATIENT PROFILE ADULT - FALL HARM RISK - UNIVERSAL INTERVENTIONS
Bed in lowest position, wheels locked, appropriate side rails in place/Call bell, personal items and telephone in reach/Instruct patient to call for assistance before getting out of bed or chair/Non-slip footwear when patient is out of bed/[place] to call system/Physically safe environment - no spills, clutter or unnecessary equipment/Purposeful Proactive Rounding/Room/bathroom lighting operational, light cord in reach

## 2023-07-27 NOTE — DISCHARGE NOTE PROVIDER - CARE PROVIDER_API CALL
Juanjo Feldman  Surgical Critical Care  [address] [place] [address] Level [place] [address]  Phone: [phone]  Fax: [phone]  Follow Up Time: 2 weeks

## 2023-07-28 ENCOUNTER — TRANSCRIPTION ENCOUNTER (OUTPATIENT)
Age: 33
End: 2023-07-28

## 2023-07-28 VITALS
DIASTOLIC BLOOD PRESSURE: 55 MMHG | SYSTOLIC BLOOD PRESSURE: 115 MMHG | TEMPERATURE: 97 F | OXYGEN SATURATION: 100 % | HEART RATE: 82 BPM | RESPIRATION RATE: 18 BRPM

## 2023-07-28 RX ORDER — OXYCODONE HYDROCHLORIDE 5 MG/1
1 TABLET ORAL
Qty: 8 | Refills: 0
Start: 2023-07-28

## 2023-07-28 RX ORDER — ACETAMINOPHEN 500 MG
3 TABLET ORAL
Qty: 0 | Refills: 0 | DISCHARGE
Start: 2023-07-28 | End: 2023-08-04

## 2023-07-28 RX ORDER — IBUPROFEN 200 MG
1 TABLET ORAL
Qty: 0 | Refills: 0 | DISCHARGE
Start: 2023-07-28 | End: 2023-08-04

## 2023-07-28 RX ORDER — IBUPROFEN 200 MG
400 TABLET ORAL EVERY 6 HOURS
Refills: 0 | Status: DISCONTINUED | OUTPATIENT
Start: 2023-07-28 | End: 2023-07-28

## 2023-07-28 RX ORDER — OXYCODONE HYDROCHLORIDE 5 MG/1
5 TABLET ORAL EVERY 4 HOURS
Refills: 0 | Status: DISCONTINUED | OUTPATIENT
Start: 2023-07-28 | End: 2023-07-28

## 2023-07-28 RX ORDER — ACETAMINOPHEN 500 MG
975 TABLET ORAL EVERY 6 HOURS
Refills: 0 | Status: DISCONTINUED | OUTPATIENT
Start: 2023-07-28 | End: 2023-07-28

## 2023-07-28 RX ADMIN — Medication 975 MILLIGRAM(S): at 07:39

## 2023-07-28 RX ADMIN — Medication 400 MILLIGRAM(S): at 07:40

## 2023-07-28 RX ADMIN — HYDROMORPHONE HYDROCHLORIDE 0.5 MILLIGRAM(S): 2 INJECTION INTRAMUSCULAR; INTRAVENOUS; SUBCUTANEOUS at 00:30

## 2023-07-28 RX ADMIN — Medication 400 MILLIGRAM(S): at 03:28

## 2023-07-28 RX ADMIN — Medication 1000 MILLIGRAM(S): at 00:07

## 2023-07-28 RX ADMIN — Medication 975 MILLIGRAM(S): at 07:02

## 2023-07-28 RX ADMIN — Medication 1000 MILLIGRAM(S): at 04:30

## 2023-07-28 RX ADMIN — Medication 400 MILLIGRAM(S): at 07:02

## 2023-07-28 NOTE — DISCHARGE NOTE NURSING/CASE MANAGEMENT/SOCIAL WORK - NSDCPNINST_GEN_ALL_CORE
Do not scrub your incision sites or remove the dressings. They may fall off on their own or will be removed during your follow up visit. Do not use any  powerder, lotion or cream on or near incision sites. Watch for signs of infection; redness, swelling, fever, chills or heat, temperature of 100.4 or higher or pain unrelieved by pain medication, report such symptoms to the MD. No driving while taking pain medication, it causes drowsiness & constipation. Drink 6-8 glasses of fluids daily to promote hydration. If unable to tolerate diet and experiencing nausea/vomiting, call your provider. No heavy lifting, pulling or pushing heavy objects. Follow up with the MD.

## 2023-07-28 NOTE — DISCHARGE NOTE NURSING/CASE MANAGEMENT/SOCIAL WORK - PATIENT PORTAL LINK FT
You can access the FollowMyHealth Patient Portal offered by [facility] by registering at the following website: [email or web address]. By joining [employer]’s FollowMyHealth portal, you will also be able to view your health information using other applications (apps) compatible with our system.

## 2023-08-09 LAB — SURGICAL PATHOLOGY STUDY: SIGNIFICANT CHANGE UP

## 2023-08-14 PROBLEM — Z78.9 OTHER SPECIFIED HEALTH STATUS: Chronic | Status: ACTIVE | Noted: 2023-07-26

## 2023-08-14 NOTE — H&P ADULT - NSHPPHYSICALEXAM_GEN_ALL_CORE
PSA normal but he still waiting for testosterone test result.   Vital Signs Last 24 Hrs  T(C): 36.4 (26 Jul 2023 23:07), Max: 36.8 (26 Jul 2023 16:25)  T(F): 97.5 (26 Jul 2023 23:07), Max: 98.2 (26 Jul 2023 16:25)  HR: 73 (26 Jul 2023 23:07) (73 - 78)  BP: 112/55 (26 Jul 2023 23:07) (112/55 - 125/66)  BP(mean): --  RR: 18 (26 Jul 2023 23:07) (18 - 18)  SpO2: 100% (26 Jul 2023 23:07) (100% - 100%)    Parameters below as of 26 Jul 2023 23:07  Patient On (Oxygen Delivery Method): room air    General: well developed, well nourished, NAD  Neuro: alert and oriented, no focal deficits, moves all extremities spontaneously  HEENT: NCAT, EOMI, anicteric, mucosa moist  Respiratory: airway patent, respirations unlabored  CVS: regular rate and rhythm  Abdomen: soft, nondistended +TTP in RUQ, +Anne's sign  Extremities: no edema, sensation and movement grossly intact  Skin: warm, dry, appropriate color

## 2023-08-15 ENCOUNTER — APPOINTMENT (OUTPATIENT)
Dept: TRAUMA SURGERY | Facility: HOSPITAL | Age: 33
End: 2023-08-15
Payer: COMMERCIAL

## 2023-08-15 VITALS
SYSTOLIC BLOOD PRESSURE: 115 MMHG | WEIGHT: 241 LBS | DIASTOLIC BLOOD PRESSURE: 63 MMHG | BODY MASS INDEX: 37.83 KG/M2 | HEART RATE: 75 BPM | HEIGHT: 67 IN | TEMPERATURE: 97.9 F

## 2023-08-15 PROCEDURE — 99024 POSTOP FOLLOW-UP VISIT: CPT

## 2023-08-16 NOTE — HISTORY OF PRESENT ILLNESS
[de-identified] : Doing well since surgery no specific concerns denies fever, nausea, vomiting, jaundice, diarrhea

## 2023-08-16 NOTE — ASSESSMENT
[FreeTextEntry1] : Recovering as expected after lap CCY for acute calculous cholecystitis dietary and exercise recommendations given questions answered f/u prn

## 2023-08-16 NOTE — PHYSICAL EXAM
[Respiratory Effort] : normal respiratory effort [Abdominal Masses] : No abdominal masses [Abdomen Tenderness] : ~T ~M No abdominal tenderness [No HSM] : no hepatosplenomegaly [Alert] : alert [Oriented to Person] : oriented to person [Oriented to Place] : oriented to place [Oriented to Time] : oriented to time [Calm] : calm [de-identified] : well appearing [de-identified] : anicteric [de-identified] : supple [de-identified] : incisions c/d/i without discernible trocar site hernias [de-identified] : no ankle edema, no calf tenderness [de-identified] : no jaundice

## 2024-12-25 PROBLEM — F10.90 ALCOHOL USE: Status: INACTIVE | Noted: 2017-06-27

## 2025-02-04 NOTE — ASU PREOP CHECKLIST - SITE MARKED BY ANESTHESIOLOGIST
Pre op instructions reviewed with patient face to face during Clinic Visit with Provider, verbalized understanding.    To confirm, Surgery is scheduled on 2/12/25. We will call you after 2pm the day before Surgery with your arrival time.    *Please report to the [facility] Lobby (1st Floor) located off of [facility] (2nd Entrance/Building on the left, in front of the flag pole).  Address: [address] [place] [address] [place] [address]    Your Type & Screen appointment is scheduled on 2/11/25 @ [facility] Please DO NOT remove the red arm band applied.      !!!INSTRUCTIONS IMPORTANT!!!  !!!NO FOOD after midnight! You may have clear liquids up to 3 hrs before your arrival to the Hospital!!!  Clear liquids include Gatorade, water, soda, black coffee or tea (no milk or creamer), and clear juices.  Clear liquids do NOT include anything with pulp or food particles (Chicken broth, ice cream, yogurt, Jello, etc.)  NOTHING RED OR PURPLE!!!!      MORNING OF SURGERY, drink small sip of water with the following medications instructed by Pre-Admit Provider:  NONE    *Additional Surgeon Instructions: Take Tylenol 650 mg and drink a bottle of Gatorade the night prior to surgery! BRING WALKER to Hospital if received prior to surgery!    If your are taking Ozempic/ Mounjaro/ Wegovy/ Trulicity/ Semaglutide injections or weight loss medication, such of Phentermine, please notify us immediately as they must be stopped 7 days prior to surgery.    !!!Patients should HOLD all vitamins, herbal supplements,aspirins, NSAIDS & weight loss medications 7 days prior to surgery!!! Ok to take Tylenol:)    ____  Avoid Alcoholic beverages 3 days prior to surgery, as it can thin the blood.  ____  NO Acrylic/fake nails or nail polish worn day of surgery (specifically hand/arm & foot surgeries).  ____  NO powder, lotions, deodorants, oils or cream on body.  ____  Remove all jewelry, piercings, & foreign objects prior to  The patient called seeking results of her blood borne pathogen labs.  She was seen by Dr. Echevarria and those notes are reviewed.  She had an HIV which was negative, hepatitis C antibody negative, hepatitis-B antigen negative, hepatitis-B antibody negative, normal ALT.  The patient will need to repeat the hepatitis B series and an order was placed in the computer and the patient was scheduled for a nurse only appointment.  The patient states that they are unable to get the source drawn so therefore she will have repeat labs in 6 weeks and 4 months.  She is scheduled to see Dr. Echevarria in 6 weeks.   arrival and leave at home.  ____  Remove Dentures, Hearing Aids & Contact Lens prior to surgery.  ____  Bring photo ID and insurance information to hospital (Leave Valuables at Home).  ____  If going home the same day, arrange for a ride home. You will not be able to drive for 24 hrs if Anesthesia was used.   ____  Females (ages 11-60): may need to give a urine sample the morning of surgery; please see Pre op Nurse prior to using the restroom.  ____  Wear clean, loose fitting clothing to allow for dressings/ bandages.      Bathing Instructions:       -Do not shave your body 3 days before the day of surgery.              -Shower with Dial / Hibiclens soap/antibacterial for 3 days prior to surgery to decrease risk of infection             -Shower & Rinse your body as usual with anti-bacterial Soap, (Dial), for three days before surgery              - on the evening prior to surgery and the morning of surgery, apply one packet of Hibiclens soap to the surgical site.               -Wash the site gently for 5 full minutes. Do Not scrub your skin too hard.               -Rinse your body thoroughly.                -Pat yourself day with a clean, soft towel.               -Do not use lotion, cream, powder or deodorant               -Dress in clean clothes      [facility] Vis[facility]/Ride Policy:  Only 2 adults allowed in pre op/recovery area during your procedure. You MUST HAVE A RIDE HOME from a responsible adult that you know and trust. Medical Transport, Uber or Lyft can ONLY be used if patient has a responsible adult to accompany them during ride home.        *Signs and symptoms of Infection Before or After Surgery:               !!!If you experience any fever, chills, nausea/ vomiting, foul odor/ excessive drainage from surgical site, flu-like symptoms, new wounds or cuts, PLEASE CALL THE SURGEON OFFICE at [phone] or SEND MESSAGE THROUGH [email or web address]!!!       *If you are running late day of surgery, please call the  Surgery Dept @ [phone].    *Billing question, please call:  [phone] or [phone]       Thank you,  -[facility] Pre Admit Dept.  [phone] - [place]   or [phone]  M-F 7:30 am-4:00 pm (Closed Major Holidays)    Additional Tests Scheduled Today:  LABS / URINE TEST (1ST Floor) Check in at the !     n/a

## (undated) DEVICE — PROTECTOR HEEL / ELBOW

## (undated) DEVICE — ENDOCATCH 10MM SPECIMEN POUCH

## (undated) DEVICE — TUBING STRYKEFLOW II SUCTION / IRRIGATOR

## (undated) DEVICE — PACK GENERAL LAPAROSCOPY

## (undated) DEVICE — DISSECTOR KITTNER 5 MM TIP

## (undated) DEVICE — VENODYNE/SCD SLEEVE CALF MEDIUM

## (undated) DEVICE — TUBING INSUFFLATION LAP FILTER 10FT

## (undated) DEVICE — TIP METZENBAUM SCISSOR MONOPOLAR ENDOCUT (ORANGE)

## (undated) DEVICE — CANISTER DISPOSABLE THIN WALL 3000CC

## (undated) DEVICE — DISSECTOR ENDOSCOPIC KITTNER SINGLE TIP

## (undated) DEVICE — TUBING STRYKER PNEUMOCLEAR SMOKE HEAT HUMID

## (undated) DEVICE — INSUFFLATION NDL COVIDIEN SURGINEEDLE VERESS 120MM

## (undated) DEVICE — TROCAR COVIDIEN VERSAPORT BLADELESS OPTICAL 11MM STANDARD

## (undated) DEVICE — TUBING OLYMPUS INSUFFLATION

## (undated) DEVICE — TROCAR COVIDIEN BLUNT TIP HASSAN 10MM

## (undated) DEVICE — TROCAR COVIDIEN VERSAPORT BLADELESS OPTICAL 5MM STANDARD

## (undated) DEVICE — GLV 7.5 PROTEXIS (WHITE)

## (undated) DEVICE — BLADE SURGICAL #15 CARBON

## (undated) DEVICE — SOL IRR POUR NS 0.9% 1000ML

## (undated) DEVICE — ELCTR GROUNDING PAD ADULT COVIDIEN

## (undated) DEVICE — ELCTR BOVIE TIP BLADE INSULATED 2.75" EDGE

## (undated) DEVICE — DRSG STERISTRIPS 0.5 X 4"

## (undated) DEVICE — SUT MONOCRYL 4-0 27" PS-2 UNDYED

## (undated) DEVICE — SYR LUER LOK 20CC

## (undated) DEVICE — SOL IRR POUR H2O 500ML

## (undated) DEVICE — DRAPE LAPAROSCOPIC CHOLECYSTECTOMY

## (undated) DEVICE — DRSG DERMABOND 0.7ML

## (undated) DEVICE — ELCTR BOVIE PENCIL SMOKE EVACUATION

## (undated) DEVICE — DRAPE TOWEL BLUE 17" X 24"

## (undated) DEVICE — TROCAR COVIDIEN VERSAONE FIXATION CANNULA 5MM

## (undated) DEVICE — WARMING BLANKET UPPER ADULT

## (undated) DEVICE — BASIN SET SINGLE

## (undated) DEVICE — DRAPE 3/4 SHEET 52X76"

## (undated) DEVICE — TROCAR COVIDIEN BLUNT TIP HASSAN 10MM STANDARD

## (undated) DEVICE — APPLICATOR FOR ARISTA XL 38CM

## (undated) DEVICE — D HELP - CLEARVIEW CLEARIFY SYSTEM

## (undated) DEVICE — POSITIONER STRAP ARMBOARD VELCRO TS-30

## (undated) DEVICE — SUT VICRYL 0 27" UR-6